# Patient Record
Sex: MALE
[De-identification: names, ages, dates, MRNs, and addresses within clinical notes are randomized per-mention and may not be internally consistent; named-entity substitution may affect disease eponyms.]

---

## 2017-08-23 ENCOUNTER — HOSPITAL ENCOUNTER (EMERGENCY)
Dept: HOSPITAL 31 - C.ER | Age: 52
Discharge: HOME | End: 2017-08-23
Payer: COMMERCIAL

## 2017-08-23 VITALS
DIASTOLIC BLOOD PRESSURE: 77 MMHG | RESPIRATION RATE: 20 BRPM | SYSTOLIC BLOOD PRESSURE: 117 MMHG | HEART RATE: 68 BPM | OXYGEN SATURATION: 97 % | TEMPERATURE: 97.8 F

## 2017-08-23 DIAGNOSIS — Z48.00: Primary | ICD-10-CM

## 2018-01-16 ENCOUNTER — HOSPITAL ENCOUNTER (EMERGENCY)
Dept: HOSPITAL 31 - C.ER | Age: 53
Discharge: HOME | End: 2018-01-16
Payer: SELF-PAY

## 2018-01-16 VITALS
DIASTOLIC BLOOD PRESSURE: 78 MMHG | SYSTOLIC BLOOD PRESSURE: 119 MMHG | TEMPERATURE: 98.8 F | OXYGEN SATURATION: 99 % | HEART RATE: 75 BPM | RESPIRATION RATE: 18 BRPM

## 2018-01-16 VITALS — BODY MASS INDEX: 22.5 KG/M2

## 2018-01-16 DIAGNOSIS — M54.30: Primary | ICD-10-CM

## 2018-01-16 NOTE — C.PDOC
History Of Present Illness


52-year-old male, presents to the emergency department with complaints of right 

lower back pain that radiates to right lower leg x1 week, after he bent over to 

pick something up. Patient admits to similar symptoms in past and has a Hx of 

sciatica. He denies any direct trauma, injuries, fever, chills, sore throat, 

abd. pain, N/V/D, UTI sx, saddle anesthesia, incontinence, denies deformity, 

weakness, sensory or vascular deficits to B?L lEs. Ambulate to Ed for evaluation

, not in any apparent distress.


Time Seen by Provider: 01/16/18 18:49


Chief Complaint (Nursing): Back Pain


History Per: Patient





Past Medical History


Reviewed: Historical Data, Nursing Documentation, Vital Signs


Vital Signs: 


 Last Vital Signs











Temp  98.8 F   01/16/18 18:20


 


Pulse  75   01/16/18 18:20


 


Resp  18   01/16/18 18:20


 


BP  119/78   01/16/18 18:20


 


Pulse Ox  99   01/16/18 20:12














- Medical History


PMH: No Chronic Diseases


Family History: States: No Known Family Hx





- Social History


Hx Alcohol Use: No


Hx Substance Use: No





- Immunization History


Hx Tetanus Toxoid Vaccination: Yes (2017)


Hx Influenza Vaccination: No


Hx Pneumococcal Vaccination: No





Review Of Systems


Except As Marked, All Systems Reviewed And Found Negative.


Constitutional: Negative for: Fever, Chills


ENT: Negative for: Throat Pain


Cardiovascular: Negative for: Chest Pain


Respiratory: Negative for: Cough, Shortness of Breath, Wheezing


Gastrointestinal: Negative for: Nausea, Vomiting, Abdominal Pain


Genitourinary: Negative for: Dysuria, Frequency, Incontinence


Musculoskeletal: Positive for: Back Pain.  Negative for: Neck Pain


Skin: Negative for: Rash


Neurological: Negative for: Weakness, Numbness





Physical Exam





- Physical Exam


Appears: Well, Non-toxic, No Acute Distress


Skin: Normal Color, Warm, Dry, No Rash


Head: Normacephalic


Eye(s): bilateral: PERRL


Nose: No Flaring, No Discharge


Oral Mucosa: Moist, No Drooling


Throat: No Drooling


Neck: Supple


Cardiovascular: Rhythm Regular


Respiratory: No Decreased Breath Sounds, No Accessory Muscle Use, No Stridor, 

No Wheezing


Gastrointestinal/Abdominal: Soft, No Tenderness, No Distention, No Guarding


Back: No CVA Tenderness, No Vertebral Tenderness, Paraspinal Tenderness (Right 

sided lumbar with mod muscle spasm.)


Extremity: Normal ROM, No Pedal Edema, No Deformity, No Swelling


Neurological/Psych: Oriented x3, Normal Speech, Normal Motor, Normal Sensation, 

Normal Reflexes





ED Course And Treatment


O2 Sat by Pulse Oximetry: 99


Pulse Ox Interpretation: Normal


Progress Note: On re-evaluation, pt is afebrile, hemodynamicaly stable.  Non-

toxic.  Ambulatory in ED with stable gait.  ENT: no acute findings.  neck: 

Supple, (-) JVD, (-) carotid bruits B/L.  Lungs: CTA B/L, BS equal B/L.  CVS: (+

)S1S2, reg.  Abd: benign.  Back: (-) CVA tenderness.  Neuorlogicaly intact.  Pt 

has clinical findings c/w lumbar radiculopathy.  Advised.  ref. to f/u with PMD 

in2 -3 days for re-eavl.  return if any new changes.





Disposition


Counseled Patient/Family Regarding: Diagnosis, Need For Followup, Rx Given





- Disposition


Referrals: 


[facility] [Outside]


Disposition: HOME/ ROUTINE


Disposition Time: 19:22


Condition: STABLE


Prescriptions: 


Ibuprofen [Motrin Tab] 600 mg PO Q6 #20 tab


Methocarbamol [Robaxin] 500 mg PO TID #14 tab


traMADol [Ultram] 50 mg PO TID #7 tab


Instructions:  Lumbar Radiculopathy (ED)


Forms:  [facility] (English)





- Clinical Impression


Clinical Impression: 


 Sciatica